# Patient Record
Sex: FEMALE | Race: WHITE | NOT HISPANIC OR LATINO
[De-identification: names, ages, dates, MRNs, and addresses within clinical notes are randomized per-mention and may not be internally consistent; named-entity substitution may affect disease eponyms.]

---

## 2019-06-13 PROBLEM — N92.1 PROLONGED MENSTRUATION: Status: ACTIVE | Noted: 2019-06-13

## 2019-06-13 PROBLEM — Z00.00 ENCOUNTER FOR PREVENTIVE HEALTH EXAMINATION: Status: ACTIVE | Noted: 2019-06-13

## 2019-06-14 ENCOUNTER — RECORD ABSTRACTING (OUTPATIENT)
Age: 49
End: 2019-06-14

## 2019-06-14 DIAGNOSIS — Z87.42 PERSONAL HISTORY OF OTHER DISEASES OF THE FEMALE GENITAL TRACT: ICD-10-CM

## 2019-06-14 DIAGNOSIS — F41.9 ANXIETY DISORDER, UNSPECIFIED: ICD-10-CM

## 2019-06-14 DIAGNOSIS — N83.209 UNSPECIFIED OVARIAN CYST, UNSPECIFIED SIDE: ICD-10-CM

## 2019-06-14 DIAGNOSIS — F17.200 NICOTINE DEPENDENCE, UNSPECIFIED, UNCOMPLICATED: ICD-10-CM

## 2019-06-14 DIAGNOSIS — N84.0 POLYP OF CORPUS UTERI: ICD-10-CM

## 2019-06-14 DIAGNOSIS — Z78.9 OTHER SPECIFIED HEALTH STATUS: ICD-10-CM

## 2019-06-14 DIAGNOSIS — N92.6 IRREGULAR MENSTRUATION, UNSPECIFIED: ICD-10-CM

## 2019-06-14 DIAGNOSIS — Z87.898 PERSONAL HISTORY OF OTHER SPECIFIED CONDITIONS: ICD-10-CM

## 2019-06-14 DIAGNOSIS — Z86.39 PERSONAL HISTORY OF OTHER ENDOCRINE, NUTRITIONAL AND METABOLIC DISEASE: ICD-10-CM

## 2019-06-14 DIAGNOSIS — N94.6 DYSMENORRHEA, UNSPECIFIED: ICD-10-CM

## 2019-06-14 DIAGNOSIS — Z82.49 FAMILY HISTORY OF ISCHEMIC HEART DISEASE AND OTHER DISEASES OF THE CIRCULATORY SYSTEM: ICD-10-CM

## 2019-06-14 LAB — CYTOLOGY CVX/VAG DOC THIN PREP: NORMAL

## 2019-06-14 RX ORDER — ESCITALOPRAM OXALATE 10 MG/1
10 TABLET, FILM COATED ORAL
Refills: 0 | Status: ACTIVE | COMMUNITY

## 2019-06-17 ENCOUNTER — APPOINTMENT (OUTPATIENT)
Dept: OBGYN | Facility: CLINIC | Age: 49
End: 2019-06-17
Payer: COMMERCIAL

## 2019-06-17 VITALS
BODY MASS INDEX: 28.25 KG/M2 | SYSTOLIC BLOOD PRESSURE: 120 MMHG | HEIGHT: 67 IN | DIASTOLIC BLOOD PRESSURE: 76 MMHG | WEIGHT: 180 LBS

## 2019-06-17 DIAGNOSIS — Z01.411 ENCOUNTER FOR GYNECOLOGICAL EXAMINATION (GENERAL) (ROUTINE) WITH ABNORMAL FINDINGS: ICD-10-CM

## 2019-06-17 DIAGNOSIS — Z83.3 FAMILY HISTORY OF DIABETES MELLITUS: ICD-10-CM

## 2019-06-17 DIAGNOSIS — Z12.4 ENCOUNTER FOR SCREENING FOR MALIGNANT NEOPLASM OF CERVIX: ICD-10-CM

## 2019-06-17 DIAGNOSIS — Z78.9 OTHER SPECIFIED HEALTH STATUS: ICD-10-CM

## 2019-06-17 DIAGNOSIS — N93.8 OTHER SPECIFIED ABNORMAL UTERINE AND VAGINAL BLEEDING: ICD-10-CM

## 2019-06-17 DIAGNOSIS — Z12.31 ENCOUNTER FOR SCREENING MAMMOGRAM FOR MALIGNANT NEOPLASM OF BREAST: ICD-10-CM

## 2019-06-17 DIAGNOSIS — N92.1 EXCESSIVE AND FREQUENT MENSTRUATION WITH IRREGULAR CYCLE: ICD-10-CM

## 2019-06-17 DIAGNOSIS — R23.3 SPONTANEOUS ECCHYMOSES: ICD-10-CM

## 2019-06-17 PROCEDURE — 81003 URINALYSIS AUTO W/O SCOPE: CPT | Mod: QW

## 2019-06-17 PROCEDURE — 99386 PREV VISIT NEW AGE 40-64: CPT

## 2019-06-17 PROCEDURE — 99202 OFFICE O/P NEW SF 15 MIN: CPT | Mod: 25

## 2019-06-17 PROCEDURE — 36415 COLL VENOUS BLD VENIPUNCTURE: CPT

## 2019-06-17 PROCEDURE — 81025 URINE PREGNANCY TEST: CPT

## 2019-06-17 NOTE — REVIEW OF SYSTEMS
[Recent Wt Gain ___ Lbs] : recent [unfilled] ~Ulb weight gain [Headache] : headache [Sleep Disturbances] : sleep disturbances [Abdominal Pain] : abdominal pain [Anxiety] : anxiety [Nl] : Integumentary [FreeTextEntry1] : EXCESSIVE THIRST

## 2019-06-17 NOTE — COUNSELING
[Breast Self Exam] : breast self exam [Exercise] : exercise [Smoking Cessation] : smoking cessation [FreeTextEntry2] : see HPI for additonal counseling performed

## 2019-06-17 NOTE — HISTORY OF PRESENT ILLNESS
[Last Pap ___] : Last cervical pap smear was [unfilled] [Perimenopausal] : is perimenopausal [Menstrual Problems] : reports abnormal menses [Pregnancy History] : pregnancy history: [Approximately ___ (Month)] : the LMP was approximately [unfilled] month(s) ago [Menarche Age: ____] : age at menarche was [unfilled] [Good] : being in good health [___ Year(s) Ago] : [unfilled] year(s) ago [Healthy Diet] : a healthy diet [Regular Exercise] : regular exercise [Weight Concerns] : no concerns with her weight [Contraception] : does not use contraception [Normal Amount/Duration] : was abnormal [Regular Cycle Intervals] : periods have been irregular [Sexually Active] : is not sexually active

## 2019-06-17 NOTE — PHYSICAL EXAM
[Awake] : awake [Alert] : alert [Acute Distress] : no acute distress [LAD] : no lymphadenopathy [Thyroid Nodule] : no thyroid nodule [Goiter] : no goiter [Mass] : no breast mass [Nipple Discharge] : no nipple discharge [Axillary LAD] : no axillary lymphadenopathy [Breast Implant Bilateral] : implants [Soft] : soft [Tender] : non tender [Labia Majora] : labia major [Normal] : clitoris [Labia Minora] : labia minora [Scant] : there was scant vaginal bleeding [Uterine Adnexae] : were not tender and not enlarged

## 2019-06-18 LAB
BASOPHILS # BLD AUTO: 0.1 K/UL
BASOPHILS NFR BLD AUTO: 1 %
EOSINOPHIL # BLD AUTO: 0.34 K/UL
EOSINOPHIL NFR BLD AUTO: 3.4 %
HCT VFR BLD CALC: 47.9 %
HGB BLD-MCNC: 14.8 G/DL
IMM GRANULOCYTES NFR BLD AUTO: 0.3 %
LYMPHOCYTES # BLD AUTO: 3.22 K/UL
LYMPHOCYTES NFR BLD AUTO: 31.8 %
MAN DIFF?: NORMAL
MCHC RBC-ENTMCNC: 29.5 PG
MCHC RBC-ENTMCNC: 30.9 GM/DL
MCV RBC AUTO: 95.6 FL
MONOCYTES # BLD AUTO: 0.61 K/UL
MONOCYTES NFR BLD AUTO: 6 %
NEUTROPHILS # BLD AUTO: 5.84 K/UL
NEUTROPHILS NFR BLD AUTO: 57.5 %
PLATELET # BLD AUTO: 168 K/UL
RBC # BLD: 5.01 M/UL
RBC # FLD: 15.3 %
WBC # FLD AUTO: 10.14 K/UL

## 2019-06-19 LAB
ESTRADIOL SERPL-MCNC: 27 PG/ML
FSH SERPL-MCNC: 19.8 IU/L
HPV HIGH+LOW RISK DNA PNL CVX: NOT DETECTED
LH SERPL-ACNC: 21.4 IU/L
PROLACTIN SERPL-MCNC: 12.4 NG/ML
T3FREE SERPL-MCNC: 3.18 PG/ML
T4 FREE SERPL-MCNC: 1.1 NG/DL
TSH SERPL-ACNC: 1.47 UIU/ML

## 2019-06-24 ENCOUNTER — APPOINTMENT (OUTPATIENT)
Dept: OBGYN | Facility: CLINIC | Age: 49
End: 2019-06-24
Payer: COMMERCIAL

## 2019-06-24 VITALS
BODY MASS INDEX: 28.25 KG/M2 | HEIGHT: 67 IN | SYSTOLIC BLOOD PRESSURE: 118 MMHG | DIASTOLIC BLOOD PRESSURE: 72 MMHG | WEIGHT: 180 LBS

## 2019-06-24 LAB
BILIRUB UR QL STRIP: NORMAL
GLUCOSE UR-MCNC: NORMAL
HCG UR QL: 0.2 EU/DL
HCG UR QL: NEGATIVE
HGB UR QL STRIP.AUTO: NORMAL
KETONES UR-MCNC: NORMAL
LEUKOCYTE ESTERASE UR QL STRIP: NORMAL
NITRITE UR QL STRIP: NORMAL
PH UR STRIP: 5.5
PROT UR STRIP-MCNC: NORMAL
QUALITY CONTROL: YES
SP GR UR STRIP: 1.03

## 2019-06-24 PROCEDURE — 81003 URINALYSIS AUTO W/O SCOPE: CPT | Mod: QW

## 2019-06-24 PROCEDURE — 58558Z: CUSTOM

## 2019-06-24 PROCEDURE — 81025 URINE PREGNANCY TEST: CPT

## 2019-06-27 ENCOUNTER — APPOINTMENT (OUTPATIENT)
Dept: OBGYN | Facility: CLINIC | Age: 49
End: 2019-06-27
Payer: COMMERCIAL

## 2019-06-27 ENCOUNTER — MESSAGE (OUTPATIENT)
Age: 49
End: 2019-06-27

## 2019-06-27 ENCOUNTER — TRANSCRIPTION ENCOUNTER (OUTPATIENT)
Age: 49
End: 2019-06-27

## 2019-06-27 ENCOUNTER — ASOB RESULT (OUTPATIENT)
Age: 49
End: 2019-06-27

## 2019-06-27 VITALS
DIASTOLIC BLOOD PRESSURE: 80 MMHG | BODY MASS INDEX: 28.56 KG/M2 | SYSTOLIC BLOOD PRESSURE: 122 MMHG | WEIGHT: 182 LBS | HEIGHT: 67 IN

## 2019-06-27 DIAGNOSIS — N93.9 ABNORMAL UTERINE AND VAGINAL BLEEDING, UNSPECIFIED: ICD-10-CM

## 2019-06-27 LAB
BILIRUB UR QL STRIP: ABNORMAL
CYTOLOGY CVX/VAG DOC THIN PREP: NORMAL
DHEA-SULFATE, SERUM: 44 UG/DL
GLUCOSE UR-MCNC: NORMAL
HCG UR QL: 0.2 EU/DL
HCG UR QL: NEGATIVE
HGB UR QL STRIP.AUTO: ABNORMAL
KETONES UR-MCNC: NORMAL
LEUKOCYTE ESTERASE UR QL STRIP: NORMAL
NITRITE UR QL STRIP: NORMAL
PH UR STRIP: 5.5
PROT UR STRIP-MCNC: ABNORMAL
QUALITY CONTROL: YES
SP GR UR STRIP: 1.03
TESTOST BND SERPL-MCNC: 0.5 PG/ML
TESTOST SERPL-MCNC: 19.5 NG/DL

## 2019-06-27 PROCEDURE — 76830 TRANSVAGINAL US NON-OB: CPT

## 2019-06-27 PROCEDURE — 99213 OFFICE O/P EST LOW 20 MIN: CPT

## 2019-06-27 PROCEDURE — 81003 URINALYSIS AUTO W/O SCOPE: CPT | Mod: QW

## 2019-06-27 PROCEDURE — 81025 URINE PREGNANCY TEST: CPT

## 2019-06-27 PROCEDURE — 36415 COLL VENOUS BLD VENIPUNCTURE: CPT

## 2019-07-08 LAB
BASOPHILS # BLD AUTO: 0.1 K/UL
BASOPHILS NFR BLD AUTO: 1 %
CORE LAB BIOPSY: NORMAL
EOSINOPHIL # BLD AUTO: 0.22 K/UL
EOSINOPHIL NFR BLD AUTO: 2.1 %
HCT VFR BLD CALC: 46.8 %
HGB BLD-MCNC: 14.4 G/DL
IMM GRANULOCYTES NFR BLD AUTO: 0.6 %
LYMPHOCYTES # BLD AUTO: 3.05 K/UL
LYMPHOCYTES NFR BLD AUTO: 29 %
MAN DIFF?: NORMAL
MCHC RBC-ENTMCNC: 29.4 PG
MCHC RBC-ENTMCNC: 30.8 GM/DL
MCV RBC AUTO: 95.7 FL
MONOCYTES # BLD AUTO: 0.88 K/UL
MONOCYTES NFR BLD AUTO: 8.4 %
NEUTROPHILS # BLD AUTO: 6.21 K/UL
NEUTROPHILS NFR BLD AUTO: 58.9 %
PLATELET # BLD AUTO: 155 K/UL
RBC # BLD: 4.89 M/UL
RBC # FLD: 14.8 %
WBC # FLD AUTO: 10.52 K/UL

## 2019-07-09 ENCOUNTER — APPOINTMENT (OUTPATIENT)
Dept: OBGYN | Facility: CLINIC | Age: 49
End: 2019-07-09
Payer: COMMERCIAL

## 2019-07-09 VITALS
WEIGHT: 182 LBS | BODY MASS INDEX: 28.56 KG/M2 | SYSTOLIC BLOOD PRESSURE: 122 MMHG | DIASTOLIC BLOOD PRESSURE: 80 MMHG | HEIGHT: 67 IN

## 2019-07-09 PROCEDURE — 99214 OFFICE O/P EST MOD 30 MIN: CPT

## 2019-07-09 NOTE — HISTORY OF PRESENT ILLNESS
[Last Pap ___] : Last cervical pap smear was [unfilled] [Definite:  ___ (Date)] : the last menstrual period was [unfilled] [Menarche Age: ____] : age at menarche was [unfilled] [Sexually Active] : is not sexually active

## 2019-07-09 NOTE — PHYSICAL EXAM
[Awake] : awake [Alert] : alert [Soft] : soft [Oriented x3] : oriented to person, place, and time [No Lesions] : no genitalia lesions [Normal] : uterus [Labia Majora] : labia major [Pink Rugae] : pink rugae [Labia Minora] : labia minora [Scant] : there was scant vaginal bleeding [Normal Position] : in a normal position [Uterine Adnexae] : were not tender and not enlarged [Acute Distress] : no acute distress [Tender] : non tender [Distended] : not distended [Depressed Mood] : not depressed [Labia Majora Erythema] : no erythema of the labia majora [Labia Minora Erythema] : no erythema of the labia minora [Motion Tenderness] : there was no cervical motion tenderness [Tenderness] : nontender [Enlarged ___ wks] : not enlarged [Mass ___ cm] : no uterine mass was palpated [Adnexa Tenderness] : were not tender [FreeTextEntry4] : 10 cc of dark vaginal blood [Ovarian Mass (___ Cm)] : there were no adnexal masses

## 2019-07-09 NOTE — HISTORY OF PRESENT ILLNESS
[___ Month(s) Ago] : [unfilled] month(s) ago [Healthy Diet] : a healthy diet [Good] : being in good health [Regular Exercise] : regular exercise [Last Pap ___] : Last cervical pap smear was [unfilled] [unknown] : the patient is unsure of the date of her LMP [Menarche Age: ____] : age at menarche was [unfilled] [Contraception] : does not use contraception

## 2019-09-03 ENCOUNTER — APPOINTMENT (OUTPATIENT)
Dept: OBGYN | Facility: CLINIC | Age: 49
End: 2019-09-03

## 2019-09-16 ENCOUNTER — APPOINTMENT (OUTPATIENT)
Dept: OBGYN | Facility: HOSPITAL | Age: 49
End: 2019-09-16

## 2019-10-01 ENCOUNTER — APPOINTMENT (OUTPATIENT)
Dept: OBGYN | Facility: CLINIC | Age: 49
End: 2019-10-01

## 2019-10-21 NOTE — ASSESSMENT
[FreeTextEntry1] : -AUB- s/p Hysteroscopy and EMB today. Procedure details, r/b/a were discussed. Risks discussed include but are not limited to pain, bleeding, infection, uterine perforation, and injury to nearby organs. Consent signed today. Please see procedure note above. \par \par -Pain control PRN with OTC Ibuprofen.\par \par -Pelvic rest x 2 weeks discussed.\par \par -Follow up in 2 weeks. Call parameters were discussed.\par

## 2019-10-21 NOTE — PROCEDURE
[Endometrial Biopsy] : Endometrial biopsy [Irregular Bleeding] : irregular uterine bleeding [Risks] : risks [Benefits] : benefits [Alternatives] : alternatives [Patient] : patient [Infection] : infection [Bleeding] : bleeding [Uterine Perforation] : uterine perforation [Pain] : pain [LMP ___] : LMP was [unfilled] [CONSENT OBTAINED] : written consent was obtained prior to the procedure. [Neg Pregnancy Test] : a pregnancy test was negative [No Premedication] : No premedication [None] : none [Tenaculum] : a single toothed tenaculum [Required Dilation] : required dilation [Sounded to ___ cm] : sounded to [unfilled] ~Ucm [Pipelle] : a Pipelle endometrial suction curette [Moderate] : a moderate [Sent to Histology] : the specimen was place in buffered formalin and sent for pathlogy [Tolerated Well] : the patient tolerated the procedure well [No Complications] : there were no complications

## 2020-03-25 ENCOUNTER — APPOINTMENT (OUTPATIENT)
Dept: OBGYN | Facility: CLINIC | Age: 50
End: 2020-03-25

## 2021-03-05 ENCOUNTER — APPOINTMENT (OUTPATIENT)
Dept: OBGYN | Facility: CLINIC | Age: 51
End: 2021-03-05
Payer: MEDICAID

## 2021-03-05 VITALS
SYSTOLIC BLOOD PRESSURE: 118 MMHG | WEIGHT: 204 LBS | DIASTOLIC BLOOD PRESSURE: 82 MMHG | BODY MASS INDEX: 32.78 KG/M2 | HEIGHT: 66 IN | TEMPERATURE: 96.6 F

## 2021-03-05 LAB
HCG UR QL: NEGATIVE
QUALITY CONTROL: YES

## 2021-03-05 PROCEDURE — XXXXX: CPT

## 2021-03-05 NOTE — END OF VISIT
[FreeTextEntry3] : I, García Adamson, acted solely as a scribe for Dr. Dunn on this date 03/05/2021.\par All medical record entries made by the Scribe were at my, Dr. Dunn's direction and personally dictated by me on  03/05/2021. I have reviewed the chart and agree that the record accurately reflects my personal performance of the history, physical exam, assessment and plan. I have also personally directed, reviewed, and agreed with the chart.\par

## 2021-03-05 NOTE — HISTORY OF PRESENT ILLNESS
[Patient reported PAP Smear was normal] : Patient reported PAP Smear was normal [N] : Patient does not use contraception [Y] : Positive pregnancy history [Menarche Age: ____] : age at menarche was [unfilled] [Currently Active] : currently active [Men] : men [No] : No [TextBox_4] : Pt presents with complaints of irregular bleeding and would like to discuss being put on birth control. Pt states she did not have her period for 9 months then got it [PapSmeardate] : 06/17/2019 [HPVDate] : 06/17/2019 [TextBox_78] : NEG [LMPDate] : 02/15/2021 [PGHxTotal] : 2 [[facility] [FreeTextEntry1] : 02/15/21

## 2021-03-05 NOTE — DISCUSSION/SUMMARY
[FreeTextEntry1] : -AUB: Office hysteroscopy w/ EMB in 2019. Results were discussed. Pelvic sono ordered.  We discussed the recommendation for endometrial sampling due to h/o AUB. Options for hysteroscopy with EMB in the office. She desires to proceed with HYST with EMB in the office. Procedure details, r/b/a were discussed.\par \par - Contraception counseling: She desires IUD placement.  I explained the risks associated with hormonal contraception given the fact that she is an active smoker. \par \par She is also due for her annual exam.\par \par She will RTO in 1-2 weeks for pelvic sono, annual exam and Hyst w/ EMB.\par \par All questions and concerns were addressed.\par

## 2021-03-17 ENCOUNTER — APPOINTMENT (OUTPATIENT)
Dept: OBGYN | Facility: CLINIC | Age: 51
End: 2021-03-17
Payer: MEDICAID

## 2021-03-17 ENCOUNTER — ASOB RESULT (OUTPATIENT)
Age: 51
End: 2021-03-17

## 2021-03-17 VITALS
WEIGHT: 199 LBS | HEIGHT: 65 IN | DIASTOLIC BLOOD PRESSURE: 70 MMHG | BODY MASS INDEX: 33.15 KG/M2 | SYSTOLIC BLOOD PRESSURE: 122 MMHG | TEMPERATURE: 96.4 F

## 2021-03-17 DIAGNOSIS — R92.2 INCONCLUSIVE MAMMOGRAM: ICD-10-CM

## 2021-03-17 DIAGNOSIS — Z01.419 ENCOUNTER FOR GYNECOLOGICAL EXAMINATION (GENERAL) (ROUTINE) W/OUT ABNORMAL FINDINGS: ICD-10-CM

## 2021-03-17 DIAGNOSIS — N93.9 ABNORMAL UTERINE AND VAGINAL BLEEDING, UNSPECIFIED: ICD-10-CM

## 2021-03-17 DIAGNOSIS — Z12.31 ENCOUNTER FOR SCREENING MAMMOGRAM FOR MALIGNANT NEOPLASM OF BREAST: ICD-10-CM

## 2021-03-17 PROCEDURE — 99072 ADDL SUPL MATRL&STAF TM PHE: CPT

## 2021-03-17 PROCEDURE — 76830 TRANSVAGINAL US NON-OB: CPT

## 2021-03-17 NOTE — DISCUSSION/SUMMARY
[FreeTextEntry1] : 1. Annual gynecological exam\par -Annual well woman visit done today. Pap collected. STI testing declined. \par \par -Normal pelvic sonogram reviewed with patient\par \par -Rx mammogram screening and breast ultrasound ordered.Benign breast exam today. \par \par -She was advised to take vitamin D, calcium, and continue exercises. Smoking cessation encouraged.\par \par -Recommended following up with dermatology for annual skin surveillance\par \par 2. AUB\par -Patient decided against hysteroscopy with EMB today due to feeling anxious and will reschedule.\par \par -She desires IUD insertion. I explained the risks associated with hormonal contraception given the fact that she is an active smoker.\par \par -Lab work ordered to evaluate hormone levels \par \par All questions and concerns were addressed.

## 2021-03-17 NOTE — END OF VISIT
[FreeTextEntry3] : I, Leigh Alves, solely acted as a scribe for Dr. Mabel Groves on 03/17/2021 . All medical entries made by the Scribe were at my, Dr. Dunn's, direction and personally dictated by me on 03/17/2021. I have reviewed the chart and agree that the record accurately reflects my personal performance of the history, physical exam, assessment and plan. I have also personally directed, reviewed and agreed with the chart.

## 2021-03-17 NOTE — PHYSICAL EXAM
[Appropriately responsive] : appropriately responsive [Alert] : alert [No Acute Distress] : no acute distress [No Lymphadenopathy] : no lymphadenopathy [Soft] : soft [Non-tender] : non-tender [Non-distended] : non-distended [No HSM] : No HSM [No Lesions] : no lesions [No Mass] : no mass [Oriented x3] : oriented x3 [FreeTextEntry7] : abdominoplasty scar well healed [Examination Of The Breasts] : a normal appearance [] : implants [No Discharge] : no discharge [No Masses] : no breast masses were palpable [Labia Majora] : normal [Labia Minora] : normal [Normal] : normal [Uterine Adnexae] : normal

## 2021-03-17 NOTE — HISTORY OF PRESENT ILLNESS
[N] : Patient does not use contraception [Y] : Positive pregnancy history [Irregular Menstrual Interval] : irregular menstrual interval [FreeTextEntry1] : Ms. PICKETT presents for an annual exam and hysteroscopy w/ EMB.\par \par She is doing well. She denies having any abdominal pain, vaginal bleeding, or vaginal discharge. \par Last menstrual period was: 2/15/21. \par \par Patient states she is a smoker. She states she is weaning off of Lexapro. \par \par She states she had carpel tunnel surgery recently. \par \par She plans on going to [place] in April.  [TextBox_4] : Annual [Mammogramdate] : 2015 [PapSmeardate] : 6/17/19 [TextBox_31] : ne [PGHxTotal] : 2 [[facility]] : 2 [[facility] [TextBox_29] : n/a [TextBox_36] : n/a [TextBox_6] : 2/15/21

## 2021-03-29 DIAGNOSIS — Z97.5 PRESENCE OF (INTRAUTERINE) CONTRACEPTIVE DEVICE: ICD-10-CM

## 2021-03-30 ENCOUNTER — APPOINTMENT (OUTPATIENT)
Dept: OBGYN | Facility: CLINIC | Age: 51
End: 2021-03-30
Payer: MEDICAID

## 2021-03-30 ENCOUNTER — ASOB RESULT (OUTPATIENT)
Age: 51
End: 2021-03-30

## 2021-03-30 VITALS
BODY MASS INDEX: 32.82 KG/M2 | HEIGHT: 65 IN | TEMPERATURE: 96.8 F | WEIGHT: 197 LBS | SYSTOLIC BLOOD PRESSURE: 124 MMHG | DIASTOLIC BLOOD PRESSURE: 80 MMHG

## 2021-03-30 DIAGNOSIS — N92.6 IRREGULAR MENSTRUATION, UNSPECIFIED: ICD-10-CM

## 2021-03-30 DIAGNOSIS — Z30.430 ENCOUNTER FOR INSERTION OF INTRAUTERINE CONTRACEPTIVE DEVICE: ICD-10-CM

## 2021-03-30 LAB
CYTOLOGY CVX/VAG DOC THIN PREP: NORMAL
ESTRADIOL SERPL-MCNC: 14 PG/ML
FSH SERPL-MCNC: 55.9 IU/L
HPV HIGH+LOW RISK DNA PNL CVX: NOT DETECTED
PROLACTIN SERPL-MCNC: 7.2 NG/ML
TSH SERPL-ACNC: 0.94 UIU/ML

## 2021-03-30 PROCEDURE — 76376 3D RENDER W/INTRP POSTPROCES: CPT | Mod: 59

## 2021-03-30 PROCEDURE — 58300 INSERT INTRAUTERINE DEVICE: CPT

## 2021-03-30 PROCEDURE — XXXXX: CPT

## 2021-03-30 PROCEDURE — 76830 TRANSVAGINAL US NON-OB: CPT

## 2021-03-30 PROCEDURE — 81025 URINE PREGNANCY TEST: CPT

## 2021-04-27 LAB
HCG UR QL: NEGATIVE
QUALITY CONTROL: YES

## 2021-06-25 PROBLEM — N92.6 IRREGULAR MENSTRUAL CYCLE: Status: ACTIVE | Noted: 2021-06-25

## 2021-06-25 PROBLEM — Z30.430 ENCOUNTER FOR INSERTION OF MIRENA IUD: Status: ACTIVE | Noted: 2021-06-25

## 2021-06-25 NOTE — PROCEDURE
[IUD Placement] : intrauterine device (IUD) placement [Time out performed] : Pre-procedure time out performed.  Patient's name, date of birth and procedure confirmed. [Consent Obtained] : Consent obtained [Prevention of Pregnancy] : prevention of pregnancy [Risks] : risks [Benefits] : benefits [Alternatives] : alternatives [Patient] : patient [Infection] : infection [Bleeding] : bleeding [Pain] : pain [Expulsion] : expulsion [Failure] : failure [Uterine Perforation] : uterine perforation [Neg Pregnancy Test] : negative pregnancy test [Ibuprofen ___ mg] : ibuprofen [unfilled] ~Umg [Tenaculum] : Tenaculum [Required Dilation] : required dilation [Sounded to ___ cm] : sounded to [unfilled] ~Ucm [Mirena IUD] : Mirena IUD [Tolerated Well] : Patient tolerated the procedure well [No Complications] : No complications [None] : None [Abnormal Uterine Bleeding] : abnormal uterine bleeding [LMPDate] : 2/2021 [de-identified] : [id number] [de-identified] : 5/2023 [de-identified] : 3/2026

## 2021-06-25 NOTE — HISTORY OF PRESENT ILLNESS
[Mammogramdate] : 2015 [PapSmeardate] : 03/17/21 [TextBox_31] : NEG [LMPDate] : 02/15/21 [PGHxTotal] : 2 [facility] [TextBox_6] : 02/15/21 [TextBox_9] : 12 [FreeTextEntry1] : 02/15/21

## 2021-06-25 NOTE — HISTORY OF PRESENT ILLNESS
[Mammogramdate] : 2015 [PapSmeardate] : 03/17/21 [TextBox_31] : NEG [LMPDate] : 02/15/21 [PGHxTotal] : 2 [[facility] [[facility] [TextBox_6] : 02/15/21 [TextBox_9] : 12 [FreeTextEntry1] : 02/15/21

## 2021-06-25 NOTE — PROCEDURE
[IUD Placement] : intrauterine device (IUD) placement [Time out performed] : Pre-procedure time out performed.  Patient's name, date of birth and procedure confirmed. [Consent Obtained] : Consent obtained [Prevention of Pregnancy] : prevention of pregnancy [Risks] : risks [Benefits] : benefits [Alternatives] : alternatives [Patient] : patient [Infection] : infection [Bleeding] : bleeding [Pain] : pain [Expulsion] : expulsion [Failure] : failure [Uterine Perforation] : uterine perforation [Neg Pregnancy Test] : negative pregnancy test [Ibuprofen ___ mg] : ibuprofen [unfilled] ~Umg [Tenaculum] : Tenaculum [Required Dilation] : required dilation [Sounded to ___ cm] : sounded to [unfilled] ~Ucm [Mirena IUD] : Mirena IUD [Tolerated Well] : Patient tolerated the procedure well [No Complications] : No complications [None] : None [Abnormal Uterine Bleeding] : abnormal uterine bleeding [LMPDate] : 2/2021 [de-identified] : [id number] [de-identified] : 5/2023 [de-identified] : 3/2026

## 2021-06-25 NOTE — END OF VISIT
[FreeTextEntry3] : I, Leigh Alves, solely acted as a scribe for Dr. Mabel Groves on 03/30/2021 . All medical entries made by the Scribe were at my, Dr. Dunn's, direction and personally dictated by me on 03/30/2021. I have reviewed the chart and agree that the record accurately reflects my personal performance of the history, physical exam, assessment and plan. I have also personally directed, reviewed and agreed with the chart.

## 2021-06-25 NOTE — DISCUSSION/SUMMARY
[FreeTextEntry1] : -Irregular periods- Patient reports having periods q1-3 months until 5/2020. Her next period after this was in 2/2021 for 5 days with normal flow. She denies having any heavy menstrual flow.\par 1) AUB labs ordered at last visit were significant for elevated FSH and low estradiol. We discussed that this indicates that she is perimenopausal. She has not gone 1 year without a period yet. \par 2) Pelvic sono today shows: Anteverted uterus. EMS: 3.95 cm. Normal appearing bilateral ovaries. No free fluid. Results were discussed. \par 3) She was previously considering a hysteroscopy and EMB, however she changed her mind. This is reasonable since her irregular periods is likely due to her perimenopausal status. She has a thin EMS on sono of 3.95 cm. She has a prior EMB in 2019, which was benign. She denies having more than 1 period per month, prolonged periods or heavy periods. \par \par -Patient desires Mirena IUD for management of her irregular periods and contraception. We discussed her low likelihood of pregnancy given her age and elevated FSH level. She verbalized understanding, however desires to proceed with Mirena IUD. Procedure details, r/b/a were discussed. Risks discussed include but are not limited to pain, bleeding, infection, uterine perforation, and injury to nearby organs. Possible side effects were also discussed include but are not limited to headache, mood swings, breast tenderness, weight gain, acne, ovarian cysts, menstrual cycle changes, and blood clots. Consent was signed today. Please see procedure note above. Post IUD insertion sono confirmed that IUD placement appears normal. Post-procedure expectations and pelvic rest were reviewed.\par \par -Smoking- Recommended that she quit smoking. We discussed her risk of development of blood clots given her age, smoking status, and use of contraception. She verbalized understanding.\par \par -Follow up in 1 month with pelvic sono for IUD check. Call parameters were reviewed. \par \par All questions and concerns were discussed.